# Patient Record
Sex: MALE | Race: WHITE | URBAN - METROPOLITAN AREA
[De-identification: names, ages, dates, MRNs, and addresses within clinical notes are randomized per-mention and may not be internally consistent; named-entity substitution may affect disease eponyms.]

---

## 2021-12-15 ENCOUNTER — OFFICE VISIT (OUTPATIENT)
Dept: URBAN - METROPOLITAN AREA CLINIC 78 | Facility: CLINIC | Age: 51
End: 2021-12-15

## 2022-01-18 ENCOUNTER — OFFICE VISIT (OUTPATIENT)
Dept: URBAN - METROPOLITAN AREA SURGERY CENTER 15 | Facility: SURGERY CENTER | Age: 52
End: 2022-01-18

## 2022-03-05 ENCOUNTER — OUT OF OFFICE VISIT (OUTPATIENT)
Dept: URBAN - METROPOLITAN AREA MEDICAL CENTER 27 | Facility: MEDICAL CENTER | Age: 52
End: 2022-03-05
Payer: COMMERCIAL

## 2022-03-05 DIAGNOSIS — D12.4 ADENOMA OF DESCENDING COLON: ICD-10-CM

## 2022-03-05 DIAGNOSIS — K92.2 ACUTE GASTROINTESTINAL BLEEDING: ICD-10-CM

## 2022-03-05 DIAGNOSIS — K62.1 ANAL AND RECTAL POLYP: ICD-10-CM

## 2022-03-05 DIAGNOSIS — K26.9 CHILDHOOD DUODENAL ULCER: ICD-10-CM

## 2022-03-05 DIAGNOSIS — D62 ABLA (ACUTE BLOOD LOSS ANEMIA): ICD-10-CM

## 2022-03-05 DIAGNOSIS — K29.60 ADENOPAPILLOMATOSIS GASTRICA: ICD-10-CM

## 2022-03-05 DIAGNOSIS — I85.10 ESOPH VARICE OTHER DIS: ICD-10-CM

## 2022-03-05 DIAGNOSIS — D12.5 ADENOMA OF SIGMOID COLON: ICD-10-CM

## 2022-03-05 DIAGNOSIS — K63.89 BACTERIAL OVERGROWTH SYNDROME: ICD-10-CM

## 2022-03-05 DIAGNOSIS — K92.1 ACUTE MELENA: ICD-10-CM

## 2022-03-05 DIAGNOSIS — R11.2 ACUTE NAUSEA WITH NONBILIOUS VOMITING: ICD-10-CM

## 2022-03-05 DIAGNOSIS — K70.30 ALC (ALCOHOLIC LIVER CIRRHOSIS): ICD-10-CM

## 2022-03-05 PROCEDURE — 45384 COLONOSCOPY W/LESION REMOVAL: CPT | Performed by: INTERNAL MEDICINE

## 2022-03-05 PROCEDURE — 45385 COLONOSCOPY W/LESION REMOVAL: CPT | Performed by: INTERNAL MEDICINE

## 2022-03-05 PROCEDURE — 43244 EGD VARICES LIGATION: CPT | Performed by: INTERNAL MEDICINE

## 2022-03-05 PROCEDURE — 99244 OFF/OP CNSLTJ NEW/EST MOD 40: CPT | Performed by: INTERNAL MEDICINE

## 2022-03-05 PROCEDURE — 43239 EGD BIOPSY SINGLE/MULTIPLE: CPT | Performed by: INTERNAL MEDICINE

## 2022-03-06 ENCOUNTER — LAB OUTSIDE AN ENCOUNTER (OUTPATIENT)
Dept: URBAN - METROPOLITAN AREA CLINIC 23 | Facility: CLINIC | Age: 52
End: 2022-03-06

## 2022-03-07 ENCOUNTER — OUT OF OFFICE VISIT (OUTPATIENT)
Dept: URBAN - METROPOLITAN AREA MEDICAL CENTER 27 | Facility: MEDICAL CENTER | Age: 52
End: 2022-03-07
Payer: COMMERCIAL

## 2022-03-07 DIAGNOSIS — I85.10 ESOPH VARICE OTHER DIS: ICD-10-CM

## 2022-03-07 DIAGNOSIS — K26.9 CHILDHOOD DUODENAL ULCER: ICD-10-CM

## 2022-03-07 DIAGNOSIS — K92.1 ACUTE MELENA: ICD-10-CM

## 2022-03-07 DIAGNOSIS — K70.30 ALC (ALCOHOLIC LIVER CIRRHOSIS): ICD-10-CM

## 2022-03-07 PROCEDURE — 99225 SUBSEQUENT OBSERVATION CARE, PER DAY, FOR THE EVALUATION AND MANAGEMENT OF A PATIENT, WHICH REQUIRES AT LEAST 2 OF THESE 3 KEY COMPONENTS: AN EXPAND: CPT | Performed by: INTERNAL MEDICINE

## 2022-03-10 LAB
HFE GENE RESULT SUM: (no result)
PERFORMING LAB: (no result)

## 2022-03-21 ENCOUNTER — LAB OUTSIDE AN ENCOUNTER (OUTPATIENT)
Dept: URBAN - METROPOLITAN AREA CLINIC 23 | Facility: CLINIC | Age: 52
End: 2022-03-21

## 2022-03-21 ENCOUNTER — WEB ENCOUNTER (OUTPATIENT)
Dept: URBAN - METROPOLITAN AREA CLINIC 23 | Facility: CLINIC | Age: 52
End: 2022-03-21

## 2022-03-21 ENCOUNTER — OFFICE VISIT (OUTPATIENT)
Dept: URBAN - METROPOLITAN AREA CLINIC 22 | Facility: CLINIC | Age: 52
End: 2022-03-21
Payer: COMMERCIAL

## 2022-03-21 ENCOUNTER — OFFICE VISIT (OUTPATIENT)
Dept: URBAN - METROPOLITAN AREA CLINIC 23 | Facility: CLINIC | Age: 52
End: 2022-03-21
Payer: COMMERCIAL

## 2022-03-21 DIAGNOSIS — K70.10 ALCOHOLIC HEPATITIS: ICD-10-CM

## 2022-03-21 DIAGNOSIS — K70.30 ALCOHOLIC CIRRHOSIS: ICD-10-CM

## 2022-03-21 DIAGNOSIS — I85.01 ESOPHAGEAL VARICES WITH BLEEDING: ICD-10-CM

## 2022-03-21 DIAGNOSIS — Z23 ENCOUNTER FOR IMMUNIZATION: ICD-10-CM

## 2022-03-21 DIAGNOSIS — D64.9 ANEMIA, UNSPECIFIED TYPE: ICD-10-CM

## 2022-03-21 PROBLEM — 235595009 GASTROESOPHAGEAL REFLUX DISEASE: Status: ACTIVE | Noted: 2022-03-21

## 2022-03-21 PROBLEM — 127034005: Status: ACTIVE | Noted: 2022-03-21

## 2022-03-21 PROBLEM — 414916001 OBESITY: Status: ACTIVE | Noted: 2022-03-21

## 2022-03-21 PROBLEM — 429047008 HISTORY OF ADENOMATOUS POLYP OF COLON: Status: ACTIVE | Noted: 2022-03-21

## 2022-03-21 PROCEDURE — 99214 OFFICE O/P EST MOD 30 MIN: CPT | Performed by: INTERNAL MEDICINE

## 2022-03-21 PROCEDURE — 90636 HEP A/HEP B VACC ADULT IM: CPT | Performed by: INTERNAL MEDICINE

## 2022-03-21 PROCEDURE — 90471 IMMUNIZATION ADMIN: CPT | Performed by: INTERNAL MEDICINE

## 2022-03-21 RX ORDER — PANTOPRAZOLE SODIUM 40 MG/1
1 TABLET, 30 MINUTES BEFORE BREAKFAST AND DINNER TABLET, DELAYED RELEASE ORAL TWICE A DAY
Qty: 180 | Refills: 1

## 2022-03-21 RX ORDER — PROPRANOLOL HYDROCHLORIDE 20 MG/1
1 TABLET TABLET ORAL TWICE A DAY
Qty: 180 TABLETS | Refills: 3

## 2022-03-21 RX ORDER — PANTOPRAZOLE SODIUM 40 MG/1
1 TABLET, 30 MINUTES BEFORE BREAKFAST AND DINNER TABLET, DELAYED RELEASE ORAL TWICE A DAY
Status: ACTIVE | COMMUNITY

## 2022-03-21 RX ORDER — PROPRANOLOL HYDROCHLORIDE 20 MG/1
1 TABLET TABLET ORAL TWICE A DAY
Status: ACTIVE | COMMUNITY

## 2022-03-21 NOTE — HPI-TODAY'S VISIT:
- 50 yo  male who returns for hospital follow-up of alcoholic cirrhosis, alcoholic hepatitis, variceal bleeding s/p banding, and NSAID-induced duodenal ulcer.  For details, please refer to recent medical records from [facility], which I have reviewed and discussed with the patient. - Feels well since hospital discharge.  Has been able to quit drinking completely.  He is no longer taking meloxicam.  He is taking pantoprazole and propranolol as prescribed at the time of hospital discharge. - Denies GI complaints today - Labs dated 3/6/2022:  Negative C282Y and H63D.   - MRI of the liver on 3/6/2022 showed changes suggestive of cirrhosis with splenomegaly, varices and small amount of ascites.  Mild fat in the liver.  No evidence of liver cancer.  Gallbladder is thickened likely due to liver dysfunction.  Otherwise unremarkable pancreas, adrenal glands and kidneys.  No small bowel obstruction.  No concerning lymph nodes. - I have discussed with the patient the findings of previous endoscopic procedures, pathology results, labs, and radiology results.  All questions were answered to their satisfaction.

## 2022-03-22 LAB
A/G RATIO: 1.2
ALBUMIN: 3.7
ALKALINE PHOSPHATASE: 87
ALT (SGPT): 41
AST (SGOT): 48
BASO (ABSOLUTE): 0.1
BASOS: 1
BILIRUBIN, TOTAL: 2
BUN/CREATININE RATIO: 11
BUN: 9
CALCIUM: 8.7
CARBON DIOXIDE, TOTAL: 19
CHLORIDE: 107
CREATININE: 0.84
EGFR: 105
EOS (ABSOLUTE): 0.2
EOS: 4
FERRITIN, SERUM: 77
FOLATE (FOLIC ACID), SERUM: 4.3
GLOBULIN, TOTAL: 3.2
GLUCOSE: 69
HEMATOCRIT: 35.9
HEMATOLOGY COMMENTS:: (no result)
HEMOGLOBIN A1C: 4.6
HEMOGLOBIN: 12.2
IMMATURE CELLS: (no result)
IMMATURE GRANS (ABS): 0
IMMATURE GRANULOCYTES: 1
INR: 1.3
IRON BIND.CAP.(TIBC): 404
IRON SATURATION: 9
IRON: 35
LYMPHS (ABSOLUTE): 0.9
LYMPHS: 20
MCH: 31.1
MCHC: 34
MCV: 92
MONOCYTES(ABSOLUTE): 0.5
MONOCYTES: 13
NEUTROPHILS (ABSOLUTE): 2.6
NEUTROPHILS: 61
NRBC: (no result)
PLATELETS: 75
POTASSIUM: 4.3
PROTEIN, TOTAL: 6.9
PROTHROMBIN TIME: 13.5
RBC: 3.92
RDW: 15
RETICULOCYTE COUNT: 3
SODIUM: 140
UIBC: 369
VITAMIN B12: 1573
WBC: 4.2

## 2022-04-07 PROBLEM — 17709002 ESOPHAGEAL VARICES WITH BLEEDING: Status: ACTIVE | Noted: 2022-03-21

## 2022-04-07 PROBLEM — 235875008 ALCOHOLIC HEPATITIS: Status: ACTIVE | Noted: 2022-03-21

## 2022-04-07 PROBLEM — 420054005 ALCOHOLIC CIRRHOSIS: Status: ACTIVE | Noted: 2022-03-21

## 2022-04-13 ENCOUNTER — TELEPHONE ENCOUNTER (OUTPATIENT)
Dept: URBAN - METROPOLITAN AREA CLINIC 6 | Facility: CLINIC | Age: 52
End: 2022-04-13

## 2022-04-13 ENCOUNTER — TELEPHONE ENCOUNTER (OUTPATIENT)
Dept: URBAN - METROPOLITAN AREA CLINIC 23 | Facility: CLINIC | Age: 52
End: 2022-04-13

## 2022-04-22 ENCOUNTER — OFFICE VISIT (OUTPATIENT)
Dept: URBAN - METROPOLITAN AREA CLINIC 22 | Facility: CLINIC | Age: 52
End: 2022-04-22

## 2022-04-25 ENCOUNTER — OFFICE VISIT (OUTPATIENT)
Dept: URBAN - METROPOLITAN AREA MEDICAL CENTER 27 | Facility: MEDICAL CENTER | Age: 52
End: 2022-04-25
Payer: COMMERCIAL

## 2022-04-25 ENCOUNTER — OFFICE VISIT (OUTPATIENT)
Dept: URBAN - METROPOLITAN AREA CLINIC 22 | Facility: CLINIC | Age: 52
End: 2022-04-25
Payer: COMMERCIAL

## 2022-04-25 DIAGNOSIS — K76.6 CLINICALLY SIGNIFICANT PORTAL HYPERTENSION: ICD-10-CM

## 2022-04-25 DIAGNOSIS — K31.89 ACQUIRED DEFORMITY OF DUODENUM: ICD-10-CM

## 2022-04-25 DIAGNOSIS — K70.30 ALC (ALCOHOLIC LIVER CIRRHOSIS): ICD-10-CM

## 2022-04-25 DIAGNOSIS — Z23 ENCOUNTER FOR IMMUNIZATION: ICD-10-CM

## 2022-04-25 DIAGNOSIS — K31.7 BENIGN GASTRIC POLYP: ICD-10-CM

## 2022-04-25 PROCEDURE — 90471 IMMUNIZATION ADMIN: CPT | Performed by: INTERNAL MEDICINE

## 2022-04-25 PROCEDURE — 90636 HEP A/HEP B VACC ADULT IM: CPT | Performed by: INTERNAL MEDICINE

## 2022-04-25 PROCEDURE — 43239 EGD BIOPSY SINGLE/MULTIPLE: CPT | Performed by: INTERNAL MEDICINE

## 2022-04-25 RX ORDER — PANTOPRAZOLE SODIUM 40 MG/1
1 TABLET, 30 MINUTES BEFORE BREAKFAST AND DINNER TABLET, DELAYED RELEASE ORAL TWICE A DAY
Qty: 180 | Refills: 1 | Status: ACTIVE | COMMUNITY

## 2022-04-25 RX ORDER — PROPRANOLOL HYDROCHLORIDE 20 MG/1
1 TABLET TABLET ORAL TWICE A DAY
Qty: 180 TABLETS | Refills: 3 | Status: ACTIVE | COMMUNITY

## 2022-09-01 ENCOUNTER — OFFICE VISIT (OUTPATIENT)
Dept: URBAN - METROPOLITAN AREA CLINIC 23 | Facility: CLINIC | Age: 52
End: 2022-09-01

## 2022-09-01 ENCOUNTER — DASHBOARD ENCOUNTERS (OUTPATIENT)
Age: 52
End: 2022-09-01

## 2022-09-01 VITALS
SYSTOLIC BLOOD PRESSURE: 124 MMHG | HEIGHT: 69 IN | TEMPERATURE: 97.6 F | DIASTOLIC BLOOD PRESSURE: 74 MMHG | HEART RATE: 63 BPM | WEIGHT: 250 LBS | BODY MASS INDEX: 37.03 KG/M2

## 2022-09-01 RX ORDER — PROPRANOLOL HYDROCHLORIDE 20 MG/1
1 TABLET TABLET ORAL TWICE A DAY
Qty: 180 TABLETS | Refills: 3 | Status: ACTIVE | COMMUNITY

## 2022-09-01 RX ORDER — PANTOPRAZOLE SODIUM 40 MG/1
1 TABLET, 30 MINUTES BEFORE BREAKFAST AND DINNER TABLET, DELAYED RELEASE ORAL TWICE A DAY
Qty: 180 | Refills: 1 | Status: ACTIVE | COMMUNITY

## 2025-03-26 ENCOUNTER — CLAIMS CREATED FROM THE CLAIM WINDOW (OUTPATIENT)
Dept: URBAN - METROPOLITAN AREA MEDICAL CENTER 27 | Facility: MEDICAL CENTER | Age: 55
End: 2025-03-26

## 2025-03-26 ENCOUNTER — LAB OUTSIDE AN ENCOUNTER (OUTPATIENT)
Dept: URBAN - METROPOLITAN AREA CLINIC 23 | Facility: CLINIC | Age: 55
End: 2025-03-26

## 2025-03-26 PROCEDURE — 99254 IP/OBS CNSLTJ NEW/EST MOD 60: CPT | Performed by: INTERNAL MEDICINE

## 2025-03-27 ENCOUNTER — CLAIMS CREATED FROM THE CLAIM WINDOW (OUTPATIENT)
Dept: URBAN - METROPOLITAN AREA MEDICAL CENTER 27 | Facility: MEDICAL CENTER | Age: 55
End: 2025-03-27

## 2025-03-27 PROCEDURE — 99233 SBSQ HOSP IP/OBS HIGH 50: CPT | Performed by: INTERNAL MEDICINE

## 2025-04-04 ENCOUNTER — TELEPHONE ENCOUNTER (OUTPATIENT)
Dept: URBAN - METROPOLITAN AREA CLINIC 23 | Facility: CLINIC | Age: 55
End: 2025-04-04

## 2025-04-17 ENCOUNTER — OFFICE VISIT (OUTPATIENT)
Dept: URBAN - METROPOLITAN AREA CLINIC 23 | Facility: CLINIC | Age: 55
End: 2025-04-17

## 2025-04-17 ENCOUNTER — LAB OUTSIDE AN ENCOUNTER (OUTPATIENT)
Dept: URBAN - METROPOLITAN AREA CLINIC 23 | Facility: CLINIC | Age: 55
End: 2025-04-17

## 2025-04-17 RX ORDER — PROPRANOLOL HYDROCHLORIDE 20 MG/1
1 TABLET TABLET ORAL TWICE A DAY
Qty: 180 TABLETS | Refills: 3 | Status: ACTIVE | COMMUNITY

## 2025-04-17 RX ORDER — PANTOPRAZOLE SODIUM 40 MG/1
1 TABLET, 30 MINUTES BEFORE BREAKFAST AND DINNER TABLET, DELAYED RELEASE ORAL TWICE A DAY
Qty: 180 | Refills: 1
Start: 2025-04-17

## 2025-04-17 RX ORDER — PROPRANOLOL HYDROCHLORIDE 20 MG/1
1 TABLET TABLET ORAL TWICE A DAY
Qty: 180 TABLETS | Refills: 3
Start: 2025-04-17

## 2025-04-17 RX ORDER — PANTOPRAZOLE SODIUM 40 MG/1
1 TABLET, 30 MINUTES BEFORE BREAKFAST AND DINNER TABLET, DELAYED RELEASE ORAL TWICE A DAY
Qty: 180 | Refills: 1 | Status: ACTIVE | COMMUNITY

## 2025-05-05 ENCOUNTER — OFFICE VISIT (OUTPATIENT)
Dept: URBAN - METROPOLITAN AREA CLINIC 23 | Facility: CLINIC | Age: 55
End: 2025-05-05

## 2025-06-19 ENCOUNTER — TELEPHONE ENCOUNTER (OUTPATIENT)
Dept: URBAN - METROPOLITAN AREA SURGERY CENTER 15 | Facility: SURGERY CENTER | Age: 55
End: 2025-06-19

## 2025-06-20 ENCOUNTER — TELEPHONE ENCOUNTER (OUTPATIENT)
Dept: URBAN - METROPOLITAN AREA CLINIC 23 | Facility: CLINIC | Age: 55
End: 2025-06-20

## 2025-06-26 ENCOUNTER — OFFICE VISIT (OUTPATIENT)
Dept: URBAN - METROPOLITAN AREA MEDICAL CENTER 27 | Facility: MEDICAL CENTER | Age: 55
End: 2025-06-26

## 2025-07-03 ENCOUNTER — TELEPHONE ENCOUNTER (OUTPATIENT)
Dept: URBAN - METROPOLITAN AREA CLINIC 23 | Facility: CLINIC | Age: 55
End: 2025-07-03

## 2025-07-10 ENCOUNTER — OFFICE VISIT (OUTPATIENT)
Dept: URBAN - METROPOLITAN AREA MEDICAL CENTER 27 | Facility: MEDICAL CENTER | Age: 55
End: 2025-07-10
Payer: COMMERCIAL

## 2025-07-10 DIAGNOSIS — K74.69 CIRRHOSIS, CRYPTOGENIC: ICD-10-CM

## 2025-07-10 DIAGNOSIS — K31.89 OTHER DISEASES OF STOMACH AND DUODENUM: ICD-10-CM

## 2025-07-10 DIAGNOSIS — K63.89 OTHER SPECIFIED DISEASES OF INTESTINE: ICD-10-CM

## 2025-07-10 DIAGNOSIS — I85.10 ESOPH VARICE OTHER DIS: ICD-10-CM

## 2025-07-10 DIAGNOSIS — K29.60 OTHER GASTRITIS WITHOUT BLEEDING: ICD-10-CM

## 2025-07-10 DIAGNOSIS — Z12.11 COLON CANCER SCREENING: ICD-10-CM

## 2025-07-10 PROCEDURE — 45380 COLONOSCOPY AND BIOPSY: CPT | Performed by: INTERNAL MEDICINE

## 2025-07-10 PROCEDURE — 43239 EGD BIOPSY SINGLE/MULTIPLE: CPT | Performed by: INTERNAL MEDICINE

## 2025-07-10 RX ORDER — PROPRANOLOL HYDROCHLORIDE 20 MG/1
1 TABLET TABLET ORAL TWICE A DAY
Qty: 180 TABLETS | Refills: 3 | Status: ACTIVE | COMMUNITY
Start: 2025-04-17

## 2025-07-10 RX ORDER — PANTOPRAZOLE SODIUM 40 MG/1
1 TABLET, 30 MINUTES BEFORE BREAKFAST AND DINNER TABLET, DELAYED RELEASE ORAL TWICE A DAY
Qty: 180 | Refills: 1 | Status: ACTIVE | COMMUNITY
Start: 2025-04-17